# Patient Record
Sex: FEMALE | Race: WHITE
[De-identification: names, ages, dates, MRNs, and addresses within clinical notes are randomized per-mention and may not be internally consistent; named-entity substitution may affect disease eponyms.]

---

## 2017-01-28 ENCOUNTER — HOSPITAL ENCOUNTER (EMERGENCY)
Dept: HOSPITAL 80 - CED | Age: 72
Discharge: HOME | End: 2017-01-28
Payer: COMMERCIAL

## 2017-01-28 VITALS
DIASTOLIC BLOOD PRESSURE: 93 MMHG | RESPIRATION RATE: 20 BRPM | SYSTOLIC BLOOD PRESSURE: 172 MMHG | TEMPERATURE: 97.5 F | OXYGEN SATURATION: 93 % | HEART RATE: 89 BPM
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
## 2017-01-28 DIAGNOSIS — S50.811A: Primary | ICD-10-CM

## 2017-01-28 PROCEDURE — 90715 TDAP VACCINE 7 YRS/> IM: CPT

## 2017-01-28 PROCEDURE — 90471 IMMUNIZATION ADMIN: CPT

## 2017-01-28 NOTE — UCPHY
H & P


Time Seen by Provider: 01/28/17 14:47


Patient Type: Established


HPI/ROS: 


71-year-old female presents complaining of pinched her arm on the drier this 

afternoon, she does not recall when her last tetanus shot was.





Review of systems


General no fever no chills no weakness


HEENT no eye pain no eye discharge. No eye redness, no sore throat


Respiratory no cough, no shortness of breath


Cardiac no chest pain, no peripheral edema


GI no abdominal pain, no diarrhea, no constipation, no nausea, no vomiting


  no flank pain, no hematuria, no dysuria


Musculoskeletal no myalgias, no joint pain


Heme  no easy bruising, no easy bleeding


Endo no polyuria, no polydipsia


Skin no rashes, no pruritus


Neuro no syncope, no dizziness, no headaches


Psych is no suicidal ideation, no homicidal ideation





Past Medical/Surgical History: 


Hypothyroidism


Hyperlipidemia


Social History: 


No alcohol, no drugs


Smoking Status: Never smoked


Physical Exam: 


Alert and oriented in no acute distress nontoxic appearance, afebrile


Atraumatic normocephalic


Neck no JVD


Lungs clear to auscultation, no respiratory distress


Heart regular rate and rhythm


Extremities no cyanosis clubbing edema





Right forearm with 2 small areas approximately 1 x 1 cm deep abrasions


Constitutional: 


 Initial Vital Signs











Temperature (C)  36.4 C   01/28/17 13:53


 


Heart Rate  89   01/28/17 13:53


 


Respiratory Rate  20   01/28/17 13:53


 


Blood Pressure  172/93 H  01/28/17 13:53


 


O2 Sat (%)  93   01/28/17 13:53








 











O2 Delivery Mode               Room Air














Allergies/Adverse Reactions: 


 





Sulfa (Sulfonamide Antibiotics) Allergy (Intermediate, Verified 01/28/17 13:51)


 


Penicillins Allergy (Unknown, Verified 01/28/17 13:51)


 








Home Medications: 














 Medication  Instructions  Recorded


 


Atorvastatin Calcium [Lipitor 20 20 mg PO DAILY 08/17/11





mg]  


 


Levothyroxine 75 mcg PO 08/17/11





[Levothyroxine,Synthroid]  


 


traZODone [traZODONE 100MG (*)] 100 mg PO 08/17/11


 


Bentyl 10 MG (*)  10/09/16














Medical Decision Making


ED Course/Re-evaluation: 


Patient seen and evaluated for abrasion right forearm sustained on drier.





Impression


Abrasion





Plan


Wound cleansed, bacitracin and bandaged


Tetanus updated





- Data Points


Medications Given: 


 








Discontinued Medications





Diphtheria/Tetanus/Acell Pertussis (Boostrix)  0.5 ml IM .ONCE ONE


   Stop: 01/28/17 14:55


   Last Admin: 01/28/17 15:20 Dose:  0.5 ml








Departure





- Departure


Disposition: Home, Routine, Self-Care


Clinical Impression: 


 Abrasion of arm, right


Condition: Good


Instructions:  Abrasion (ED)


Referrals: 


[place],. [Primary Care Provider] - As per Instructions





- PQRS


PQRS Measurement: 


na

## 2018-02-11 ENCOUNTER — HOSPITAL ENCOUNTER (EMERGENCY)
Dept: HOSPITAL 80 - CED | Age: 73
Discharge: HOME | End: 2018-02-11
Payer: COMMERCIAL

## 2018-02-11 VITALS
RESPIRATION RATE: 18 BRPM | TEMPERATURE: 98.2 F | DIASTOLIC BLOOD PRESSURE: 90 MMHG | HEART RATE: 100 BPM | SYSTOLIC BLOOD PRESSURE: 130 MMHG | OXYGEN SATURATION: 93 %

## 2018-02-11 DIAGNOSIS — S41.111A: Primary | ICD-10-CM
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
## 2018-02-11 NOTE — EDPHY
H & P


Time Seen by Provider: 02/11/18 15:05


HPI/ROS: 





CHIEF COMPLAINT:  Skin tear





History by patient





HISTORY OF PRESENT ILLNESS:  72-year-old woman with multiple medical problems 

presents complaining of skin tear to right arm which occurred this afternoon 

which she scraped her arm against the corner of the sink.  She denies any other 

pain or injury. Her last tetanus shot is unknown.





REVIEW OF SYSTEMS:


As in HPI, and all other systems reviewed and are negative


Smoking Status: Never smoked


Physical Exam: 





General Appearance:  Alert and no distress. Obese


Head:  Normocephalic, atraumatic


Eyes:  Pupils equal and round no injection.  Extraocular movements are intact.


Musculoskeletal:  Neck is supple and nontender.


Extremities:  Right arm multiple superficial less than 1 cm skin tears on 

dorsal aspect of forearm, radial pulse 2 +equal to the left, distal motor and 

sensory intact


Skin:  No rashes or lesions except as described above.








Constitutional: 


 Initial Vital Signs











Temperature (C)  36.8 C   02/11/18 14:41


 


Heart Rate  100   02/11/18 14:41


 


Respiratory Rate  18   02/11/18 14:41


 


O2 Sat (%)  93   02/11/18 14:41








 











O2 Delivery Mode               Room Air














Allergies/Adverse Reactions: 


 





Sulfa (Sulfonamide Antibiotics) Allergy (Intermediate, Verified 02/11/18 14:44)


 


Penicillins Allergy (Unknown, Verified 02/11/18 14:44)


 








Home Medications: 














 Medication  Instructions  Recorded


 


Atorvastatin Calcium [Lipitor 20 20 mg PO DAILY 08/17/11





mg]  


 


Bentyl 10 MG (*)  10/09/16


 


Bp Medication  02/11/18














MDM/Departure





- [facility]


ED Course/Re-evaluation: 





71-year-old woman presents with skin tear to right arm.  On review of old 

records she had a tetanus booster 1 year ago.  Skin tear was cleaned and 

dressed with Mepilex dressing.  Patient has home health with visiting nurse 

already set up and will have the dressing changed by the nurse in 3-5 days.





- Depart


Disposition: Home, Routine, Self-Care


Clinical Impression: 


Skin tear of right upper arm without complication


Qualifiers:


 Encounter type: initial encounter Qualified Code(s): S41.111A - Laceration 

without foreign body of right upper arm, initial encounter





Condition: Good


Instructions:  Skin Tear (ED)


Additional Instructions: 


You were seen by Dr. Lindsay Pineda today.





Keep the dressing on for the next 3-5 days.  Please have your home nurse check 

on the wound at that time and reapply dressing.  We recommend Mepilex dressing 

for skin tear.





 Return for any worsening or new concerns.


Referrals: 


DR TEN [Other] - As per Instructions